# Patient Record
Sex: MALE | Race: WHITE | NOT HISPANIC OR LATINO
[De-identification: names, ages, dates, MRNs, and addresses within clinical notes are randomized per-mention and may not be internally consistent; named-entity substitution may affect disease eponyms.]

---

## 2017-07-20 ENCOUNTER — APPOINTMENT (OUTPATIENT)
Dept: FAMILY MEDICINE | Facility: CLINIC | Age: 82
End: 2017-07-20
Payer: COMMERCIAL

## 2017-07-20 VITALS
BODY MASS INDEX: 23.86 KG/M2 | WEIGHT: 152 LBS | HEART RATE: 62 BPM | HEIGHT: 67 IN | DIASTOLIC BLOOD PRESSURE: 50 MMHG | OXYGEN SATURATION: 95 % | SYSTOLIC BLOOD PRESSURE: 102 MMHG

## 2017-07-20 DIAGNOSIS — Z63.4 DISAPPEARANCE AND DEATH OF FAMILY MEMBER: ICD-10-CM

## 2017-07-20 DIAGNOSIS — Z80.9 FAMILY HISTORY OF MALIGNANT NEOPLASM, UNSPECIFIED: ICD-10-CM

## 2017-07-20 DIAGNOSIS — H91.90 UNSPECIFIED HEARING LOSS, UNSPECIFIED EAR: ICD-10-CM

## 2017-07-20 DIAGNOSIS — Z87.891 PERSONAL HISTORY OF NICOTINE DEPENDENCE: ICD-10-CM

## 2017-07-20 DIAGNOSIS — Z78.9 OTHER SPECIFIED HEALTH STATUS: ICD-10-CM

## 2017-07-20 DIAGNOSIS — N40.0 BENIGN PROSTATIC HYPERPLASIA WITHOUT LOWER URINARY TRACT SYMPMS: ICD-10-CM

## 2017-07-20 DIAGNOSIS — H11.002 UNSPECIFIED PTERYGIUM OF LEFT EYE: ICD-10-CM

## 2017-07-20 LAB
INR PPP: 1.8 RATIO
POCT-PROTHROMBIN TIME: 21.5 SECS
QUALITY CONTROL: YES

## 2017-07-20 PROCEDURE — 99204 OFFICE O/P NEW MOD 45 MIN: CPT | Mod: 25

## 2017-07-20 PROCEDURE — 85610 PROTHROMBIN TIME: CPT | Mod: QW

## 2017-07-20 PROCEDURE — 36415 COLL VENOUS BLD VENIPUNCTURE: CPT

## 2017-07-20 SDOH — SOCIAL STABILITY - SOCIAL INSECURITY: DISSAPEARANCE AND DEATH OF FAMILY MEMBER: Z63.4

## 2017-08-03 ENCOUNTER — APPOINTMENT (OUTPATIENT)
Dept: FAMILY MEDICINE | Facility: CLINIC | Age: 82
End: 2017-08-03
Payer: COMMERCIAL

## 2017-08-03 VITALS — DIASTOLIC BLOOD PRESSURE: 70 MMHG | SYSTOLIC BLOOD PRESSURE: 110 MMHG | HEART RATE: 60 BPM

## 2017-08-03 VITALS — HEIGHT: 67 IN | BODY MASS INDEX: 24.01 KG/M2 | WEIGHT: 153 LBS

## 2017-08-03 DIAGNOSIS — D64.9 ANEMIA, UNSPECIFIED: ICD-10-CM

## 2017-08-03 LAB
ALBUMIN SERPL ELPH-MCNC: 4.3 G/DL
ALP BLD-CCNC: 52 U/L
ALT SERPL-CCNC: 24 U/L
ANION GAP SERPL CALC-SCNC: 15 MMOL/L
AST SERPL-CCNC: 19 U/L
BASOPHILS # BLD AUTO: 0.02 K/UL
BASOPHILS NFR BLD AUTO: 0.3 %
BILIRUB SERPL-MCNC: 0.8 MG/DL
BUN SERPL-MCNC: 39 MG/DL
CALCIUM SERPL-MCNC: 10.1 MG/DL
CHLORIDE SERPL-SCNC: 102 MMOL/L
CHOLEST SERPL-MCNC: 147 MG/DL
CHOLEST/HDLC SERPL: 3.4 RATIO
CO2 SERPL-SCNC: 24 MMOL/L
CREAT SERPL-MCNC: 1.64 MG/DL
EOSINOPHIL # BLD AUTO: 0.19 K/UL
EOSINOPHIL NFR BLD AUTO: 2.8 %
GLUCOSE SERPL-MCNC: 126 MG/DL
HBA1C MFR BLD HPLC: 7.3 %
HCT VFR BLD CALC: 37.5 %
HDLC SERPL-MCNC: 43 MG/DL
HGB BLD-MCNC: 11.7 G/DL
IMM GRANULOCYTES NFR BLD AUTO: 0.1 %
INR PPP: 2.2 RATIO
LDLC SERPL CALC-MCNC: 77 MG/DL
LYMPHOCYTES # BLD AUTO: 1.72 K/UL
LYMPHOCYTES NFR BLD AUTO: 25.3 %
MAN DIFF?: NORMAL
MCHC RBC-ENTMCNC: 30.2 PG
MCHC RBC-ENTMCNC: 31.2 GM/DL
MCV RBC AUTO: 96.6 FL
MONOCYTES # BLD AUTO: 0.54 K/UL
MONOCYTES NFR BLD AUTO: 7.9 %
NEUTROPHILS # BLD AUTO: 4.32 K/UL
NEUTROPHILS NFR BLD AUTO: 63.6 %
PLATELET # BLD AUTO: 146 K/UL
POCT-PROTHROMBIN TIME: 25.9 SECS
POTASSIUM SERPL-SCNC: 5.4 MMOL/L
PROT SERPL-MCNC: 7.4 G/DL
QUALITY CONTROL: YES
RBC # BLD: 3.88 M/UL
RBC # FLD: 14 %
SODIUM SERPL-SCNC: 141 MMOL/L
T4 FREE SERPL-MCNC: 1.4 NG/DL
TRIGL SERPL-MCNC: 136 MG/DL
TSH SERPL-ACNC: 2.76 UIU/ML
WBC # FLD AUTO: 6.8 K/UL

## 2017-08-03 PROCEDURE — 99214 OFFICE O/P EST MOD 30 MIN: CPT | Mod: 25

## 2017-08-03 PROCEDURE — 85610 PROTHROMBIN TIME: CPT | Mod: QW

## 2017-08-06 PROBLEM — D64.9 ANEMIA: Status: ACTIVE | Noted: 2017-08-03

## 2017-08-08 ENCOUNTER — LABORATORY RESULT (OUTPATIENT)
Age: 82
End: 2017-08-08

## 2017-08-28 ENCOUNTER — APPOINTMENT (OUTPATIENT)
Dept: FAMILY MEDICINE | Facility: CLINIC | Age: 82
End: 2017-08-28
Payer: COMMERCIAL

## 2017-08-28 VITALS
BODY MASS INDEX: 24.17 KG/M2 | HEIGHT: 67 IN | WEIGHT: 154 LBS | OXYGEN SATURATION: 98 % | HEART RATE: 62 BPM | SYSTOLIC BLOOD PRESSURE: 116 MMHG | DIASTOLIC BLOOD PRESSURE: 80 MMHG

## 2017-08-28 PROCEDURE — 99214 OFFICE O/P EST MOD 30 MIN: CPT

## 2017-09-06 ENCOUNTER — APPOINTMENT (OUTPATIENT)
Dept: FAMILY MEDICINE | Facility: CLINIC | Age: 82
End: 2017-09-06
Payer: COMMERCIAL

## 2017-09-06 LAB
INR PPP: 2.2 RATIO
POCT-PROTHROMBIN TIME: 26.6 SECS

## 2017-09-06 PROCEDURE — 90662 IIV NO PRSV INCREASED AG IM: CPT

## 2017-09-06 PROCEDURE — G0008: CPT

## 2017-09-06 PROCEDURE — 85610 PROTHROMBIN TIME: CPT | Mod: QW

## 2017-09-06 PROCEDURE — 99212 OFFICE O/P EST SF 10 MIN: CPT | Mod: 25

## 2017-09-07 ENCOUNTER — MEDICATION RENEWAL (OUTPATIENT)
Age: 82
End: 2017-09-07

## 2017-09-13 ENCOUNTER — OTHER (OUTPATIENT)
Age: 82
End: 2017-09-13

## 2017-10-11 ENCOUNTER — APPOINTMENT (OUTPATIENT)
Dept: FAMILY MEDICINE | Facility: CLINIC | Age: 82
End: 2017-10-11
Payer: COMMERCIAL

## 2017-10-11 VITALS
OXYGEN SATURATION: 98 % | DIASTOLIC BLOOD PRESSURE: 65 MMHG | HEART RATE: 66 BPM | HEIGHT: 67 IN | SYSTOLIC BLOOD PRESSURE: 106 MMHG | BODY MASS INDEX: 24.17 KG/M2 | RESPIRATION RATE: 16 BRPM | WEIGHT: 154 LBS

## 2017-10-11 VITALS — RESPIRATION RATE: 12 BRPM

## 2017-10-11 LAB
INR PPP: 1.9 RATIO
POCT-PROTHROMBIN TIME: 23.1 SECS
QUALITY CONTROL: YES

## 2017-10-11 PROCEDURE — 36415 COLL VENOUS BLD VENIPUNCTURE: CPT

## 2017-10-11 PROCEDURE — 85610 PROTHROMBIN TIME: CPT | Mod: QW

## 2017-10-11 PROCEDURE — 99214 OFFICE O/P EST MOD 30 MIN: CPT | Mod: 25

## 2017-10-25 ENCOUNTER — MEDICATION RENEWAL (OUTPATIENT)
Age: 82
End: 2017-10-25

## 2017-11-02 LAB
ALBUMIN SERPL ELPH-MCNC: 4.1 G/DL
ALP BLD-CCNC: 58 U/L
ALT SERPL-CCNC: 21 U/L
ANION GAP SERPL CALC-SCNC: 13 MMOL/L
AST SERPL-CCNC: 20 U/L
BASOPHILS # BLD AUTO: 0.03 K/UL
BASOPHILS NFR BLD AUTO: 0.5 %
BILIRUB SERPL-MCNC: 0.7 MG/DL
BUN SERPL-MCNC: 43 MG/DL
CALCIUM SERPL-MCNC: 10.7 MG/DL
CHLORIDE SERPL-SCNC: 102 MMOL/L
CO2 SERPL-SCNC: 25 MMOL/L
CREAT SERPL-MCNC: 1.8 MG/DL
EOSINOPHIL # BLD AUTO: 0.23 K/UL
EOSINOPHIL NFR BLD AUTO: 3.6 %
GLUCOSE SERPL-MCNC: 144 MG/DL
HBA1C MFR BLD HPLC: 7.1 %
HCT VFR BLD CALC: 36.2 %
HGB BLD-MCNC: 11.7 G/DL
IMM GRANULOCYTES NFR BLD AUTO: 0.3 %
LYMPHOCYTES # BLD AUTO: 1.74 K/UL
LYMPHOCYTES NFR BLD AUTO: 26.9 %
MAN DIFF?: NORMAL
MCHC RBC-ENTMCNC: 30.9 PG
MCHC RBC-ENTMCNC: 32.3 GM/DL
MCV RBC AUTO: 95.5 FL
MONOCYTES # BLD AUTO: 0.61 K/UL
MONOCYTES NFR BLD AUTO: 9.4 %
NEUTROPHILS # BLD AUTO: 3.84 K/UL
NEUTROPHILS NFR BLD AUTO: 59.3 %
PLATELET # BLD AUTO: 166 K/UL
POTASSIUM SERPL-SCNC: 5 MMOL/L
PROT SERPL-MCNC: 7.4 G/DL
RBC # BLD: 3.79 M/UL
RBC # FLD: 13.6 %
SODIUM SERPL-SCNC: 140 MMOL/L
WBC # FLD AUTO: 6.47 K/UL

## 2017-11-08 ENCOUNTER — APPOINTMENT (OUTPATIENT)
Dept: FAMILY MEDICINE | Facility: CLINIC | Age: 82
End: 2017-11-08

## 2017-11-27 ENCOUNTER — NON-APPOINTMENT (OUTPATIENT)
Age: 82
End: 2017-11-27

## 2017-11-27 ENCOUNTER — APPOINTMENT (OUTPATIENT)
Dept: FAMILY MEDICINE | Facility: CLINIC | Age: 82
End: 2017-11-27
Payer: COMMERCIAL

## 2017-11-27 VITALS
HEIGHT: 67 IN | HEART RATE: 76 BPM | DIASTOLIC BLOOD PRESSURE: 70 MMHG | WEIGHT: 155 LBS | SYSTOLIC BLOOD PRESSURE: 132 MMHG | BODY MASS INDEX: 24.33 KG/M2

## 2017-11-27 LAB
INR PPP: 2.3 RATIO
POCT-PROTHROMBIN TIME: 27.3 SECS

## 2017-11-27 PROCEDURE — 99214 OFFICE O/P EST MOD 30 MIN: CPT | Mod: 25

## 2017-11-27 PROCEDURE — 85610 PROTHROMBIN TIME: CPT | Mod: QW

## 2017-11-27 PROCEDURE — 93000 ELECTROCARDIOGRAM COMPLETE: CPT

## 2017-12-29 ENCOUNTER — RECORD ABSTRACTING (OUTPATIENT)
Age: 82
End: 2017-12-29

## 2017-12-29 DIAGNOSIS — Z82.49 FAMILY HISTORY OF ISCHEMIC HEART DISEASE AND OTHER DISEASES OF THE CIRCULATORY SYSTEM: ICD-10-CM

## 2017-12-29 DIAGNOSIS — Z86.79 PERSONAL HISTORY OF OTHER DISEASES OF THE CIRCULATORY SYSTEM: ICD-10-CM

## 2017-12-29 DIAGNOSIS — Z95.0 PRESENCE OF CARDIAC PACEMAKER: ICD-10-CM

## 2018-01-03 ENCOUNTER — APPOINTMENT (OUTPATIENT)
Dept: FAMILY MEDICINE | Facility: CLINIC | Age: 83
End: 2018-01-03
Payer: COMMERCIAL

## 2018-01-03 VITALS
HEART RATE: 80 BPM | WEIGHT: 155 LBS | BODY MASS INDEX: 24.33 KG/M2 | DIASTOLIC BLOOD PRESSURE: 68 MMHG | SYSTOLIC BLOOD PRESSURE: 112 MMHG | HEIGHT: 67 IN

## 2018-01-03 LAB
INR PPP: 2 RATIO
POCT-PROTHROMBIN TIME: 24.3 SECS

## 2018-01-03 PROCEDURE — 36415 COLL VENOUS BLD VENIPUNCTURE: CPT

## 2018-01-03 PROCEDURE — 99214 OFFICE O/P EST MOD 30 MIN: CPT | Mod: 25

## 2018-01-03 PROCEDURE — 85610 PROTHROMBIN TIME: CPT | Mod: QW

## 2018-01-03 RX ORDER — ROSUVASTATIN CALCIUM 5 MG/1
TABLET, FILM COATED ORAL
Refills: 0 | Status: DISCONTINUED | COMMUNITY
End: 2018-01-03

## 2018-01-03 RX ORDER — DIGOXIN 125 UG/1
125 TABLET ORAL
Refills: 0 | Status: DISCONTINUED | COMMUNITY
End: 2018-01-03

## 2018-01-16 LAB
ANION GAP SERPL CALC-SCNC: 14 MMOL/L
BUN SERPL-MCNC: 44 MG/DL
CALCIUM SERPL-MCNC: 10 MG/DL
CHLORIDE SERPL-SCNC: 105 MMOL/L
CO2 SERPL-SCNC: 23 MMOL/L
CREAT SERPL-MCNC: 1.59 MG/DL
GLUCOSE SERPL-MCNC: 126 MG/DL
HBA1C MFR BLD HPLC: 7.2 %
POTASSIUM SERPL-SCNC: 4.9 MMOL/L
SODIUM SERPL-SCNC: 142 MMOL/L

## 2018-01-22 ENCOUNTER — RECORD ABSTRACTING (OUTPATIENT)
Age: 83
End: 2018-01-22

## 2018-01-29 ENCOUNTER — APPOINTMENT (OUTPATIENT)
Dept: CARDIOLOGY | Facility: CLINIC | Age: 83
End: 2018-01-29
Payer: COMMERCIAL

## 2018-01-29 VITALS
WEIGHT: 152 LBS | HEIGHT: 67 IN | DIASTOLIC BLOOD PRESSURE: 55 MMHG | SYSTOLIC BLOOD PRESSURE: 110 MMHG | HEART RATE: 64 BPM | BODY MASS INDEX: 23.86 KG/M2 | RESPIRATION RATE: 16 BRPM

## 2018-01-29 DIAGNOSIS — R07.89 OTHER CHEST PAIN: ICD-10-CM

## 2018-01-29 PROCEDURE — 93000 ELECTROCARDIOGRAM COMPLETE: CPT

## 2018-01-29 PROCEDURE — 99214 OFFICE O/P EST MOD 30 MIN: CPT

## 2018-02-07 ENCOUNTER — APPOINTMENT (OUTPATIENT)
Dept: FAMILY MEDICINE | Facility: CLINIC | Age: 83
End: 2018-02-07
Payer: MEDICARE

## 2018-02-07 VITALS — HEART RATE: 66 BPM | SYSTOLIC BLOOD PRESSURE: 120 MMHG | DIASTOLIC BLOOD PRESSURE: 62 MMHG | HEIGHT: 67 IN

## 2018-02-07 DIAGNOSIS — M48.061 SPINAL STENOSIS, LUMBAR REGION WITHOUT NEUROGENIC CLAUDICATION: ICD-10-CM

## 2018-02-07 LAB
INR PPP: 1.9 RATIO
POCT-PROTHROMBIN TIME: 22.3 SECS

## 2018-02-07 PROCEDURE — 99214 OFFICE O/P EST MOD 30 MIN: CPT | Mod: 25

## 2018-02-07 PROCEDURE — 85610 PROTHROMBIN TIME: CPT | Mod: QW

## 2018-02-23 PROBLEM — M48.061 LUMBAR STENOSIS: Status: ACTIVE | Noted: 2017-07-20

## 2018-03-06 ENCOUNTER — RX RENEWAL (OUTPATIENT)
Age: 83
End: 2018-03-06

## 2018-03-07 ENCOUNTER — APPOINTMENT (OUTPATIENT)
Dept: FAMILY MEDICINE | Facility: CLINIC | Age: 83
End: 2018-03-07
Payer: COMMERCIAL

## 2018-03-07 VITALS
HEART RATE: 65 BPM | WEIGHT: 154 LBS | SYSTOLIC BLOOD PRESSURE: 108 MMHG | HEIGHT: 67 IN | OXYGEN SATURATION: 99 % | DIASTOLIC BLOOD PRESSURE: 62 MMHG | BODY MASS INDEX: 24.17 KG/M2

## 2018-03-07 DIAGNOSIS — Z13.89 ENCOUNTER FOR SCREENING FOR OTHER DISORDER: ICD-10-CM

## 2018-03-07 DIAGNOSIS — Z92.29 PERSONAL HISTORY OF OTHER DRUG THERAPY: ICD-10-CM

## 2018-03-07 LAB
INR PPP: 2.1 RATIO
POCT-PROTHROMBIN TIME: 25.8 SECS

## 2018-03-07 PROCEDURE — G0444 DEPRESSION SCREEN ANNUAL: CPT

## 2018-03-07 PROCEDURE — 85610 PROTHROMBIN TIME: CPT | Mod: QW

## 2018-03-07 PROCEDURE — 99214 OFFICE O/P EST MOD 30 MIN: CPT | Mod: 25

## 2018-03-07 RX ORDER — AMOXICILLIN 500 MG/1
500 CAPSULE ORAL
Qty: 20 | Refills: 0 | Status: COMPLETED | COMMUNITY
Start: 2017-11-02 | End: 2018-03-07

## 2018-03-08 PROBLEM — Z13.89 DEPRESSION SCREENING: Status: ACTIVE | Noted: 2018-03-08

## 2018-03-12 ENCOUNTER — RX RENEWAL (OUTPATIENT)
Age: 83
End: 2018-03-12

## 2018-03-29 ENCOUNTER — APPOINTMENT (OUTPATIENT)
Dept: CARDIOLOGY | Facility: CLINIC | Age: 83
End: 2018-03-29
Payer: COMMERCIAL

## 2018-03-29 PROCEDURE — 93294 REM INTERROG EVL PM/LDLS PM: CPT

## 2018-04-03 ENCOUNTER — APPOINTMENT (OUTPATIENT)
Dept: CARDIOLOGY | Facility: CLINIC | Age: 83
End: 2018-04-03
Payer: COMMERCIAL

## 2018-04-03 VITALS
DIASTOLIC BLOOD PRESSURE: 60 MMHG | HEART RATE: 66 BPM | RESPIRATION RATE: 16 BRPM | WEIGHT: 154 LBS | BODY MASS INDEX: 24.17 KG/M2 | SYSTOLIC BLOOD PRESSURE: 120 MMHG | HEIGHT: 67 IN

## 2018-04-03 DIAGNOSIS — I63.9 CEREBRAL INFARCTION, UNSPECIFIED: ICD-10-CM

## 2018-04-03 DIAGNOSIS — E11.9 TYPE 2 DIABETES MELLITUS W/OUT COMPLICATIONS: ICD-10-CM

## 2018-04-03 PROCEDURE — 93000 ELECTROCARDIOGRAM COMPLETE: CPT

## 2018-04-03 PROCEDURE — 99214 OFFICE O/P EST MOD 30 MIN: CPT

## 2018-04-11 ENCOUNTER — APPOINTMENT (OUTPATIENT)
Dept: FAMILY MEDICINE | Facility: CLINIC | Age: 83
End: 2018-04-11
Payer: COMMERCIAL

## 2018-04-11 VITALS
DIASTOLIC BLOOD PRESSURE: 60 MMHG | SYSTOLIC BLOOD PRESSURE: 110 MMHG | HEART RATE: 68 BPM | BODY MASS INDEX: 25.27 KG/M2 | WEIGHT: 161 LBS | HEIGHT: 67 IN

## 2018-04-11 DIAGNOSIS — I51.9 HEART DISEASE, UNSPECIFIED: ICD-10-CM

## 2018-04-11 LAB
INR PPP: 2.4 RATIO
POCT-PROTHROMBIN TIME: 28.6 SECS

## 2018-04-11 PROCEDURE — 99214 OFFICE O/P EST MOD 30 MIN: CPT | Mod: 25

## 2018-04-11 PROCEDURE — 36415 COLL VENOUS BLD VENIPUNCTURE: CPT

## 2018-04-11 PROCEDURE — 85610 PROTHROMBIN TIME: CPT | Mod: QW

## 2018-04-12 LAB
ALBUMIN SERPL ELPH-MCNC: 4.1 G/DL
ALP BLD-CCNC: 61 U/L
ALT SERPL-CCNC: 19 U/L
ANION GAP SERPL CALC-SCNC: 15 MMOL/L
AST SERPL-CCNC: 21 U/L
BILIRUB SERPL-MCNC: 0.6 MG/DL
BUN SERPL-MCNC: 35 MG/DL
CALCIUM SERPL-MCNC: 10.3 MG/DL
CHLORIDE SERPL-SCNC: 104 MMOL/L
CHOLEST SERPL-MCNC: 139 MG/DL
CHOLEST/HDLC SERPL: 3.4 RATIO
CO2 SERPL-SCNC: 24 MMOL/L
CREAT SERPL-MCNC: 1.71 MG/DL
GLUCOSE SERPL-MCNC: 146 MG/DL
HBA1C MFR BLD HPLC: 6.8 %
HDLC SERPL-MCNC: 41 MG/DL
LDLC SERPL CALC-MCNC: 75 MG/DL
POTASSIUM SERPL-SCNC: 4.6 MMOL/L
PROT SERPL-MCNC: 7.3 G/DL
SODIUM SERPL-SCNC: 143 MMOL/L
TRIGL SERPL-MCNC: 114 MG/DL

## 2018-05-16 ENCOUNTER — APPOINTMENT (OUTPATIENT)
Dept: FAMILY MEDICINE | Facility: CLINIC | Age: 83
End: 2018-05-16
Payer: COMMERCIAL

## 2018-05-16 VITALS
HEIGHT: 67 IN | DIASTOLIC BLOOD PRESSURE: 62 MMHG | HEART RATE: 64 BPM | SYSTOLIC BLOOD PRESSURE: 108 MMHG | BODY MASS INDEX: 25.01 KG/M2 | WEIGHT: 159.38 LBS

## 2018-05-16 DIAGNOSIS — R79.89 OTHER SPECIFIED ABNORMAL FINDINGS OF BLOOD CHEMISTRY: ICD-10-CM

## 2018-05-16 LAB
INR PPP: 2.3 RATIO
POCT-PROTHROMBIN TIME: 27.4 SECS
QUALITY CONTROL: YES

## 2018-05-16 PROCEDURE — 99214 OFFICE O/P EST MOD 30 MIN: CPT | Mod: 25

## 2018-05-16 PROCEDURE — 85610 PROTHROMBIN TIME: CPT | Mod: QW

## 2018-05-16 NOTE — COUNSELING
[Healthy eating counseling provided] : healthy eating [Disease Management counseling provided] : disease management  [Low Fat Diet] : Low fat diet [Take medications as directed] : Take medications as directed [None] : None [Good understanding] : Patient has a good understanding of lifestyle changes and the steps needed to achieve self management goals

## 2018-05-16 NOTE — PLAN
[FreeTextEntry1] : UPCOMING APPOINTMENT WITH OPHTHALMOLOGY - DR. REAGAN\par DISCUSSED RENAL SONOGRAM - IS UNSURE IF HE WILL GO\par DUE TO ADVANCED AGE IS NOT INTERESTED IN HAVING MUCH TESTING DONE\par DISCUSSED RENAL EVALUATION AS WELL\par POCT - INR DONE\par SEE WARFARIN FLOW SHEET\par CONSISTENCY WITH DIET AND FALL PRECAUTIONS\par HBA1C WELL CONTROLLED\par HEALTHY DIET AND LIFESTYLE MODIFICATIONS\par CALL WITH ANY QUESTIONS, CONCERNS OR CHANGES \par TO HAVE DAUGHTER CALL WITH ANY QUESTIONS/CONCERNS\par AWARE WHEN TO SEEK EMERGENT CARE

## 2018-05-16 NOTE — REVIEW OF SYSTEMS
[Back Pain] : back pain [Fever] : no fever [Fatigue] : no fatigue [Pain] : no pain [Redness] : no redness [Chest Pain] : no chest pain [Palpitations] : no palpitations [Shortness Of Breath] : no shortness of breath [Wheezing] : no wheezing [Cough] : no cough [Abdominal Pain] : no abdominal pain [Nausea] : no nausea [Diarrhea] : no diarrhea [Vomiting] : no vomiting [Melena] : no melena [Dysuria] : no dysuria [Hematuria] : no hematuria [Joint Pain] : no joint pain [Headache] : no headache [Dizziness] : no dizziness [Fainting] : no fainting

## 2018-05-16 NOTE — HEALTH RISK ASSESSMENT
[No falls in past year] : Patient reported no falls in the past year [0] : 2) Feeling down, depressed, or hopeless: Not at all (0) [] : No [[id number]] : 0

## 2018-05-16 NOTE — PHYSICAL EXAM
[No Acute Distress] : no acute distress [Well Nourished] : well nourished [Well-Appearing] : well-appearing [PERRL] : pupils equal round and reactive to light [EOMI] : extraocular movements intact [Supple] : supple [No Lymphadenopathy] : no lymphadenopathy [No Respiratory Distress] : no respiratory distress  [Clear to Auscultation] : lungs were clear to auscultation bilaterally [No Accessory Muscle Use] : no accessory muscle use [Normal Rate] : normal rate  [Normal S1, S2] : normal S1 and S2 [Soft] : abdomen soft [Non Tender] : non-tender [Normal Bowel Sounds] : normal bowel sounds [Speech Grossly Normal] : speech grossly normal [Normal Mood] : the mood was normal

## 2018-05-16 NOTE — HISTORY OF PRESENT ILLNESS
[Patient was last seen on ___] : Patient was last seen on [unfilled] [No episodes] : No hypoglycemic episodes since the last visit. [Check glucose ___ x/day] : Patient checks  blood glucose [unfilled]  times a day [Fasting:  ___] : Fasting Blood Sugar: [unfilled] mg/dL [] : sometimes in range [Understanding of foot care] : Patient expressed understanding of foot care [Most Recent A1C: ___] : Most recent A1C was [unfilled] [Target goal met] : A1C target goal met [Moderate Intensity] : Patient is currently on moderate intensity statin  therapy [Doing Well] : Patient is doing well [Managed with medications] : managed with  medication [Hyperlipidemia] : Hyperlipidemia [Other: ___] : [unfilled] [FreeTextEntry6] : PRESENTING FOR FOLLOW-UP WITH AIDE.  HAS PILL BOX SET-UP.  CHRONIC HISTORY OF AFIB.  MAY HAVE MISSED ONE DOSE OF WARFARIN. NO FALLS OR CHANGE IN DIET.  NO CHANGES IN MEDICATIONS.  NO SIGNS OR SYMPTOMS OF BLEEDING PRESENT.  APPETITE IS "GOOD".  HAS UPCOMING APPOINTMENT WITH OPHTHALMOLOGIST.  MOST RECENT HBA1C WELL CONTROLLED.  ON CRESTOR FOR CHOLESTEROL.   HAS HAD NO HEADACHE, DIZZINESS, CHEST PAIN, SHORTNESS OF BREATH, GI OR URINARY COMPLAINTS.  PRIOR ELEVATED CREATININE.  HAS DECLINED FURTHER EVALUATION IN THE PAST.  NO OTHER COMPLAINTS TODAY.  [EyeExamDate] : SUMMER 2017

## 2018-06-03 ENCOUNTER — FORM ENCOUNTER (OUTPATIENT)
Age: 83
End: 2018-06-03

## 2018-06-04 ENCOUNTER — APPOINTMENT (OUTPATIENT)
Dept: FAMILY MEDICINE | Facility: CLINIC | Age: 83
End: 2018-06-04
Payer: COMMERCIAL

## 2018-06-04 ENCOUNTER — APPOINTMENT (OUTPATIENT)
Dept: RADIOLOGY | Facility: CLINIC | Age: 83
End: 2018-06-04

## 2018-06-04 ENCOUNTER — FORM ENCOUNTER (OUTPATIENT)
Age: 83
End: 2018-06-04

## 2018-06-04 ENCOUNTER — NON-APPOINTMENT (OUTPATIENT)
Age: 83
End: 2018-06-04

## 2018-06-04 ENCOUNTER — OUTPATIENT (OUTPATIENT)
Dept: OUTPATIENT SERVICES | Facility: HOSPITAL | Age: 83
LOS: 1 days | End: 2018-06-04
Payer: COMMERCIAL

## 2018-06-04 VITALS
OXYGEN SATURATION: 98 % | WEIGHT: 159 LBS | DIASTOLIC BLOOD PRESSURE: 68 MMHG | BODY MASS INDEX: 24.96 KG/M2 | HEART RATE: 64 BPM | HEIGHT: 67 IN | TEMPERATURE: 98.9 F | SYSTOLIC BLOOD PRESSURE: 120 MMHG

## 2018-06-04 DIAGNOSIS — R05 COUGH: ICD-10-CM

## 2018-06-04 DIAGNOSIS — R09.81 NASAL CONGESTION: ICD-10-CM

## 2018-06-04 DIAGNOSIS — R06.02 SHORTNESS OF BREATH: ICD-10-CM

## 2018-06-04 PROCEDURE — 99214 OFFICE O/P EST MOD 30 MIN: CPT | Mod: 25

## 2018-06-04 PROCEDURE — 71046 X-RAY EXAM CHEST 2 VIEWS: CPT

## 2018-06-04 PROCEDURE — 71046 X-RAY EXAM CHEST 2 VIEWS: CPT | Mod: 26

## 2018-06-04 PROCEDURE — 93000 ELECTROCARDIOGRAM COMPLETE: CPT

## 2018-06-05 ENCOUNTER — APPOINTMENT (OUTPATIENT)
Dept: CT IMAGING | Facility: CLINIC | Age: 83
End: 2018-06-05

## 2018-06-05 ENCOUNTER — OUTPATIENT (OUTPATIENT)
Dept: OUTPATIENT SERVICES | Facility: HOSPITAL | Age: 83
LOS: 1 days | End: 2018-06-05
Payer: COMMERCIAL

## 2018-06-05 DIAGNOSIS — R05 COUGH: ICD-10-CM

## 2018-06-05 DIAGNOSIS — R06.02 SHORTNESS OF BREATH: ICD-10-CM

## 2018-06-05 PROCEDURE — 71250 CT THORAX DX C-: CPT

## 2018-06-05 PROCEDURE — 71250 CT THORAX DX C-: CPT | Mod: 26

## 2018-06-06 ENCOUNTER — APPOINTMENT (OUTPATIENT)
Dept: CARDIOLOGY | Facility: CLINIC | Age: 83
End: 2018-06-06
Payer: COMMERCIAL

## 2018-06-06 VITALS
SYSTOLIC BLOOD PRESSURE: 126 MMHG | BODY MASS INDEX: 24.8 KG/M2 | OXYGEN SATURATION: 98 % | RESPIRATION RATE: 12 BRPM | HEART RATE: 62 BPM | HEIGHT: 67 IN | DIASTOLIC BLOOD PRESSURE: 70 MMHG | WEIGHT: 158 LBS

## 2018-06-06 PROCEDURE — 93000 ELECTROCARDIOGRAM COMPLETE: CPT

## 2018-06-06 PROCEDURE — 99214 OFFICE O/P EST MOD 30 MIN: CPT

## 2018-06-20 ENCOUNTER — APPOINTMENT (OUTPATIENT)
Dept: FAMILY MEDICINE | Facility: CLINIC | Age: 83
End: 2018-06-20
Payer: COMMERCIAL

## 2018-06-20 VITALS
DIASTOLIC BLOOD PRESSURE: 50 MMHG | HEIGHT: 67 IN | HEART RATE: 65 BPM | BODY MASS INDEX: 24.48 KG/M2 | WEIGHT: 156 LBS | SYSTOLIC BLOOD PRESSURE: 120 MMHG

## 2018-06-20 LAB
INR PPP: 2.4 RATIO
POCT-PROTHROMBIN TIME: 28.4 SECS
QUALITY CONTROL: YES

## 2018-06-20 PROCEDURE — 99214 OFFICE O/P EST MOD 30 MIN: CPT | Mod: 25

## 2018-06-20 PROCEDURE — 85610 PROTHROMBIN TIME: CPT | Mod: QW

## 2018-06-23 PROBLEM — R09.81 NASAL CONGESTION: Status: ACTIVE | Noted: 2018-06-04

## 2018-06-23 PROBLEM — R05 COUGH: Status: ACTIVE | Noted: 2018-06-04

## 2018-06-23 NOTE — PHYSICAL EXAM
[No Acute Distress] : no acute distress [Well Nourished] : well nourished [Well-Appearing] : well-appearing [PERRL] : pupils equal round and reactive to light [EOMI] : extraocular movements intact [Supple] : supple [No Lymphadenopathy] : no lymphadenopathy [No Respiratory Distress] : no respiratory distress  [Clear to Auscultation] : lungs were clear to auscultation bilaterally [No Accessory Muscle Use] : no accessory muscle use [Normal Rate] : normal rate  [Normal S1, S2] : normal S1 and S2 [Soft] : abdomen soft [Non Tender] : non-tender [Normal Bowel Sounds] : normal bowel sounds [No Joint Swelling] : no joint swelling [Grossly Normal Strength/Tone] : grossly normal strength/tone

## 2018-06-23 NOTE — COUNSELING
[Healthy eating counseling provided] : healthy eating [Activity counseling provided] : activity [Low Salt Diet] : Low salt diet [None] : None [Good understanding] : Patient has a good understanding of lifestyle changes and the steps needed to achieve self management goals

## 2018-06-23 NOTE — REVIEW OF SYSTEMS
[Fever] : no fever [Fatigue] : no fatigue [Discharge] : no discharge [Pain] : no pain [Chest Pain] : no chest pain [Palpitations] : no palpitations [Lower Ext Edema] : no lower extremity edema [Shortness Of Breath] : no shortness of breath [Cough] : no cough [Abdominal Pain] : no abdominal pain [Nausea] : no nausea [Diarrhea] : no diarrhea [Vomiting] : no vomiting [Headache] : no headache [Dizziness] : no dizziness [Fainting] : no fainting [Easy Bleeding] : no easy bleeding [Easy Bruising] : no easy bruising

## 2018-06-23 NOTE — PLAN
[FreeTextEntry1] : RX NASAL SPRAY- REVIEWED USE\par EKG: PACED AT 60 BPM\par DAUGHTER CALLED AND DISCUSSED SYMPTOMS\par DOES NOT WANT ER OR HOSPITALIZATION NOW OR FUTURE\par WANTS TO MINIMIZE TESTING AND SPECIALIST EVALUATION\par WILL GO FOR CHEST XRAY TO START\par CONCERN FOR POSSIBLE CHF\par WILL TAKE EXTRA LASIX FOR THE NEXT TWO DAYS AND SEE CARDIOLOGY AFTER\par SODIUM AVOIDANCE, MONITOR FOR EDEMA AND INCREASED SHORTNESS OF BREATH\par CALL WITH ANY QUESTIONS, CONCERNS OR CHANGES \par AGAIN STRESS ER EVALUATION WITH WORSENING IF AGREEABLE

## 2018-06-23 NOTE — PLAN
[FreeTextEntry1] : DOES NOT WANT ANY OTHER PHYSICIANS OR TESTING AND WANTS TO AVOID ER AND HOSPITALIZATIONS.  DOES NOT WANT FURTHER IMAGING\par CT CHEST REVIEWED\par SODIUM AVOIDANCE\par MONITOR WEIGHT AND FOR EDEMA/SOB\par TO REPEAT BMP FOR CARDIOLOGY AND WILL BE FOLLOWING UP ROUTINELY\par POCT - INR DONE\par SEE WARFARIN FLOW SHEET\par FOLLOW-UP ALL SPECIALISTS AS DIRECTED \par CALL WITH ANY QUESTIONS, CONCERNS OR CHANGES

## 2018-06-23 NOTE — HISTORY OF PRESENT ILLNESS
[Congestion] : congestion [FreeTextEntry8] : PRESENTING FOR ACUTE VISIT WITH AIDE.  ON WEDNESDAY AND THURSDAY HAD NASAL CONGESTION.  HAD SOME COUGH AT NIGHT.  COUGH IMPROVED LAST NIGHT.  STILL CONGESTION.  NO FEVER.  THROAT IRRITATED.  NO KNOWN SICK CONTACTS.  NO MEDICATIONS TAKEN FOR SYMPTOMS.  DOES GET ALLERGIC RHINITIS.  HAS USED FLONASE IN THE PAST WHICH HAS HELPED. SYMPTOMS HAVE IMPROVED.  NO N/V/D.  NO EDEMA.  NO CHEST PAIN.  NO KNOWN HEMOPTYSIS PER AIDE REPORT.  HAS BEEN GETTING MORE SHORTNESS OF BREATH AS OF LATE.  DENIES OTHER COMPLAINTS TODAY.

## 2018-06-23 NOTE — PHYSICAL EXAM
[No Acute Distress] : no acute distress [Well Nourished] : well nourished [Well-Appearing] : well-appearing [PERRL] : pupils equal round and reactive to light [EOMI] : extraocular movements intact [Supple] : supple [No Lymphadenopathy] : no lymphadenopathy [Clear to Auscultation] : lungs were clear to auscultation bilaterally [No Accessory Muscle Use] : no accessory muscle use [Normal Rate] : normal rate  [Normal S1, S2] : normal S1 and S2 [Soft] : abdomen soft [Non Tender] : non-tender [Normal Bowel Sounds] : normal bowel sounds [Speech Grossly Normal] : speech grossly normal [Normal Affect] : the affect was normal [de-identified] : TACHYPNIC

## 2018-06-23 NOTE — REVIEW OF SYSTEMS
[Nasal Discharge] : nasal discharge [Shortness Of Breath] : shortness of breath [Cough] : cough [Fever] : no fever [Chills] : no chills [Fatigue] : no fatigue [Earache] : no earache [Sore Throat] : no sore throat [Chest Pain] : no chest pain [Palpitations] : no palpitations [Abdominal Pain] : no abdominal pain [Nausea] : no nausea [Vomiting] : no vomiting [Melena] : no melena [Headache] : no headache [Dizziness] : no dizziness [Fainting] : no fainting [Easy Bleeding] : no easy bleeding [Easy Bruising] : no easy bruising

## 2018-06-23 NOTE — HISTORY OF PRESENT ILLNESS
[Formal Caregiver] : formal caregiver [FreeTextEntry1] : F/U AFIB [de-identified] : PRESENTING FOR FOLLOW-UP.  SAW CARDIOLOGY AFTER LAST VISIT IN EVALUATION OF SOB AND CT FINDINGS.  SPIRONOLACTONE INCREASED TO EVERY OTHER DAY.  TO HAVE REPEAT BMP.  IS NOW FEELING MUCH BETTER.  SHORTNESS OF BREATH NOW "MUCH BETTER" PER AIDE REPORT.  NO EDEMA.  DENIES CHEST PAIN, PALPITATIONS, SYNCOPE, HEADACHE OR DIZZINESS.  HAS HAD NO FALLS.  USING ROLLING WALKER.  APPETITE GOOD.   AIDE SETS UP MEDICATIONS.  NO SIGNS OR SYMPTOMS OF BLEEDING ON COUMADIN FOR ANTICOAGULATION DUE TO AFIB.  NO NEW COMPLAINTS TODAY.

## 2018-06-28 ENCOUNTER — APPOINTMENT (OUTPATIENT)
Dept: CARDIOLOGY | Facility: CLINIC | Age: 83
End: 2018-06-28
Payer: COMMERCIAL

## 2018-06-28 PROCEDURE — 93294 REM INTERROG EVL PM/LDLS PM: CPT

## 2018-07-02 ENCOUNTER — RX RENEWAL (OUTPATIENT)
Age: 83
End: 2018-07-02

## 2018-07-05 ENCOUNTER — RESULT REVIEW (OUTPATIENT)
Age: 83
End: 2018-07-05

## 2018-07-05 LAB
ANION GAP SERPL CALC-SCNC: 13 MMOL/L
BUN SERPL-MCNC: 35 MG/DL
CALCIUM SERPL-MCNC: 10.2 MG/DL
CHLORIDE SERPL-SCNC: 103 MMOL/L
CO2 SERPL-SCNC: 24 MMOL/L
CREAT SERPL-MCNC: 1.83 MG/DL
GLUCOSE SERPL-MCNC: 172 MG/DL
POTASSIUM SERPL-SCNC: 4.3 MMOL/L
SODIUM SERPL-SCNC: 140 MMOL/L

## 2018-07-06 ENCOUNTER — APPOINTMENT (OUTPATIENT)
Dept: FAMILY MEDICINE | Facility: CLINIC | Age: 83
End: 2018-07-06
Payer: COMMERCIAL

## 2018-07-06 VITALS — TEMPERATURE: 97.9 F

## 2018-07-06 VITALS — SYSTOLIC BLOOD PRESSURE: 94 MMHG | DIASTOLIC BLOOD PRESSURE: 50 MMHG | HEART RATE: 64 BPM

## 2018-07-06 DIAGNOSIS — Z79.01 LONG TERM (CURRENT) USE OF ANTICOAGULANTS: ICD-10-CM

## 2018-07-06 DIAGNOSIS — I50.9 HEART FAILURE, UNSPECIFIED: ICD-10-CM

## 2018-07-06 LAB
INR PPP: 3 RATIO
POCT-PROTHROMBIN TIME: 35.6 SECS
QUALITY CONTROL: YES

## 2018-07-06 PROCEDURE — 99214 OFFICE O/P EST MOD 30 MIN: CPT | Mod: 25

## 2018-07-06 PROCEDURE — 85610 PROTHROMBIN TIME: CPT | Mod: QW

## 2018-07-06 NOTE — PHYSICAL EXAM
[Well Nourished] : well nourished [Supple] : supple [No Lymphadenopathy] : no lymphadenopathy [Clear to Auscultation] : lungs were clear to auscultation bilaterally [No Accessory Muscle Use] : no accessory muscle use [Regular Rhythm] : with a regular rhythm [Normal S1, S2] : normal S1 and S2 [Soft] : abdomen soft [Non Tender] : non-tender [Normal Bowel Sounds] : normal bowel sounds [No Joint Swelling] : no joint swelling [Grossly Normal Strength/Tone] : grossly normal strength/tone [No Rash] : no rash [Speech Grossly Normal] : speech grossly normal [Normal Mood] : the mood was normal [Normal Insight/Judgement] : insight and judgment were intact [Acne] : no acne [de-identified] : MILD TACHYPNEA [de-identified] : NO SIGNIFICANT EDEMA

## 2018-07-06 NOTE — REVIEW OF SYSTEMS
[Fatigue] : fatigue [Shortness Of Breath] : shortness of breath [Fever] : no fever [Earache] : no earache [Nasal Discharge] : no nasal discharge [Sore Throat] : no sore throat [Chest Pain] : no chest pain [Palpitations] : no palpitations [Lower Ext Edema] : no lower extremity edema [Wheezing] : no wheezing [Abdominal Pain] : no abdominal pain [Diarrhea] : no diarrhea [Vomiting] : no vomiting [Melena] : no melena [Dysuria] : no dysuria [Hematuria] : no hematuria [Headache] : no headache [Dizziness] : no dizziness [Fainting] : no fainting

## 2018-07-06 NOTE — PLAN
[FreeTextEntry1] : POCT - INR DONE; SEE WARFARIN FLOW SHEET\par LONG DISCUSSION TODAY IN OFFICE WITH MR. DENG AND FAMILY\par MR. DENG AND FAMILY REQUESTING I CALL HOSPICE PROVIDER FOR CERTIFICATION\par HE IS NOT INTERESTED IN GOING FOR ANY FURTHER LAB WORK, NO URINE TESTING, NO IMAGING, NO OTHER DOCTORS, NO ER, NO HOSPITAL.  THEY ARE LOOKING FOR MR. DENG TO BE HOME AND WITH COMFORT MEASURES ONLY\par HE HAS ELECTED FOR DNR/DNI STATUS\par I HAVE DISCUSSED THAT IT IS DIFFICULT FOR ME TO SAY THAT THE SOB IS ONLY FROM CHF WITHOUT FURTHER TESTING BUT ALL FURTHER TESTING DECLINED.  I HAVE ADVISED FOR HIM TO TAKE AN EXTRA LASIX TONIGHT TO SEE IF IMPROVEMENT AND HOSPICE PHYSICIAN WILL TAKE OVER CARE TOMORROW.  I SPOKE WITH HOSPICE INTAKE RN AND THEY ARE ALREADY ARRANGING OXYGEN.\par WILL CERTIFY FOR HOSPICE FOR CHF\par SUPPORT PROVIDED AT LENGTH\par ADVISED TO PLEASE CALL WITH ANY QUESTIONS OR CONCERNS

## 2018-07-06 NOTE — HISTORY OF PRESENT ILLNESS
[FreeTextEntry1] : SOB [de-identified] : PRESENTING FOR EVALUATION FOR HOSPICE WITH DAUGHTER, SON AND 24/7 AIDE.  HISTORY OF CHF, AFIB ON ANTICOAGULATION WITH WARFARIN, CKD, DIABETES, HEART DISEASE.  HAS HAD INCREASED SHORTNESS OF BREATH SINCE TUESDAY.  DECREASED PO INTAKE.  NO PAINS.  THEY CONTACTED HOSPICE TO COME IN TOMORROW FOR EVALUATION AND THEY WILL SEND OVER OXYGEN.  NOTES URINE DARKER.  SAW CARDIOLOGY RECENTLY AND MEDICATIONS WERE ADJUSTED.  HAS HAD NO EDEMA.  WAS TO FOLLOW UP WITH CARDIOLOGY IN ONE MONTH BUT CANCELLED APPOINTMENT.  IS NOT INTERESTED IN SEEING ANY PHYSICIANS.  DOES NOT WANT EMERGENCY ROOM OR HOSPITALIZATION.  DOES NOT WANT TO GO FOR ANY FURTHER TESTING.  WOULD LIKE TO BE HOME WITH COMFORT MEASURES ONLY.  FAMILY AWARE AND IN AGREEMENT.  NO FALLS.

## 2018-07-09 ENCOUNTER — APPOINTMENT (OUTPATIENT)
Dept: CARDIOLOGY | Facility: CLINIC | Age: 83
End: 2018-07-09

## 2018-07-09 ENCOUNTER — APPOINTMENT (OUTPATIENT)
Dept: CARDIOLOGY | Facility: CLINIC | Age: 83
End: 2018-07-09
Payer: COMMERCIAL

## 2018-07-09 VITALS
HEIGHT: 67 IN | OXYGEN SATURATION: 99 % | WEIGHT: 152.6 LBS | RESPIRATION RATE: 12 BRPM | DIASTOLIC BLOOD PRESSURE: 52 MMHG | BODY MASS INDEX: 23.95 KG/M2 | SYSTOLIC BLOOD PRESSURE: 112 MMHG | HEART RATE: 65 BPM

## 2018-07-09 DIAGNOSIS — Z00.00 ENCOUNTER FOR GENERAL ADULT MEDICAL EXAMINATION W/OUT ABNORMAL FINDINGS: ICD-10-CM

## 2018-07-09 DIAGNOSIS — G45.9 TRANSIENT CEREBRAL ISCHEMIC ATTACK, UNSPECIFIED: ICD-10-CM

## 2018-07-09 DIAGNOSIS — I49.5 SICK SINUS SYNDROME: ICD-10-CM

## 2018-07-09 DIAGNOSIS — I73.9 PERIPHERAL VASCULAR DISEASE, UNSPECIFIED: ICD-10-CM

## 2018-07-09 DIAGNOSIS — E78.00 PURE HYPERCHOLESTEROLEMIA, UNSPECIFIED: ICD-10-CM

## 2018-07-09 PROCEDURE — 99214 OFFICE O/P EST MOD 30 MIN: CPT

## 2018-07-09 PROCEDURE — 93000 ELECTROCARDIOGRAM COMPLETE: CPT

## 2018-07-09 RX ORDER — ROSUVASTATIN CALCIUM 5 MG/1
5 TABLET, FILM COATED ORAL
Qty: 90 | Refills: 1 | Status: ACTIVE | COMMUNITY
Start: 2018-03-06 | End: 1900-01-01

## 2018-07-09 RX ORDER — WARFARIN 2.5 MG/1
2.5 TABLET ORAL DAILY
Qty: 90 | Refills: 0 | Status: ACTIVE | COMMUNITY
Start: 1900-01-01 | End: 1900-01-01

## 2018-07-09 RX ORDER — DOXAZOSIN 2 MG/1
2 TABLET ORAL
Qty: 90 | Refills: 1 | Status: ACTIVE | COMMUNITY
Start: 2018-03-06 | End: 1900-01-01

## 2018-07-09 RX ORDER — FUROSEMIDE 40 MG/1
40 TABLET ORAL DAILY
Qty: 90 | Refills: 3 | Status: ACTIVE | COMMUNITY
Start: 2018-07-02 | End: 1900-01-01

## 2018-07-09 RX ORDER — METOPROLOL SUCCINATE 25 MG/1
25 TABLET, EXTENDED RELEASE ORAL DAILY
Qty: 90 | Refills: 3 | Status: ACTIVE | COMMUNITY
Start: 2018-03-12 | End: 1900-01-01

## 2018-07-09 RX ORDER — NITROGLYCERIN 40 MG/1
0.2 PATCH TRANSDERMAL DAILY
Qty: 90 | Refills: 3 | Status: ACTIVE | COMMUNITY
Start: 2018-01-29

## 2018-07-10 ENCOUNTER — APPOINTMENT (OUTPATIENT)
Dept: NEPHROLOGY | Facility: CLINIC | Age: 83
End: 2018-07-10
Payer: COMMERCIAL

## 2018-07-10 ENCOUNTER — APPOINTMENT (OUTPATIENT)
Dept: NEPHROLOGY | Facility: CLINIC | Age: 83
End: 2018-07-10

## 2018-07-10 VITALS
SYSTOLIC BLOOD PRESSURE: 120 MMHG | HEIGHT: 60 IN | BODY MASS INDEX: 30.04 KG/M2 | DIASTOLIC BLOOD PRESSURE: 42 MMHG | WEIGHT: 153 LBS | HEART RATE: 53 BPM

## 2018-07-10 DIAGNOSIS — N18.9 CHRONIC KIDNEY DISEASE, UNSPECIFIED: ICD-10-CM

## 2018-07-10 PROCEDURE — 99245 OFF/OP CONSLTJ NEW/EST HI 55: CPT

## 2018-07-12 LAB
APPEARANCE: ABNORMAL
BACTERIA: ABNORMAL
BILIRUBIN URINE: NEGATIVE
BLOOD URINE: ABNORMAL
COLOR: YELLOW
CREAT SPEC-SCNC: 39 MG/DL
CREAT/PROT UR: 1.3 RATIO
GLUCOSE QUALITATIVE U: NEGATIVE MG/DL
HYALINE CASTS: 4 /LPF
KETONES URINE: NEGATIVE
LEUKOCYTE ESTERASE URINE: ABNORMAL
MICROSCOPIC-UA: NORMAL
NITRITE URINE: POSITIVE
PH URINE: 6.5
PROT UR-MCNC: 50 MG/DL
PROTEIN URINE: 30 MG/DL
RED BLOOD CELLS URINE: 121 /HPF
SPECIFIC GRAVITY URINE: 1.01
SQUAMOUS EPITHELIAL CELLS: 0 /HPF
UROBILINOGEN URINE: NEGATIVE MG/DL
WHITE BLOOD CELLS URINE: >720 /HPF

## 2018-07-13 ENCOUNTER — APPOINTMENT (OUTPATIENT)
Dept: ULTRASOUND IMAGING | Facility: CLINIC | Age: 83
End: 2018-07-13

## 2018-07-15 LAB — CORE LAB FLUID CYTOLOGY: NORMAL

## 2018-07-17 PROBLEM — G45.9 TIA (TRANSIENT ISCHEMIC ATTACK): Status: ACTIVE | Noted: 2017-07-20

## 2018-07-17 PROBLEM — E78.00 HYPERCHOLESTEROLEMIA: Status: ACTIVE | Noted: 2017-12-29

## 2018-07-17 PROBLEM — I73.9 PAD (PERIPHERAL ARTERY DISEASE): Status: ACTIVE | Noted: 2017-12-29

## 2018-07-17 PROBLEM — I49.5 SICK SINUS SYNDROME: Status: ACTIVE | Noted: 2017-12-29

## 2018-07-18 ENCOUNTER — APPOINTMENT (OUTPATIENT)
Dept: FAMILY MEDICINE | Facility: CLINIC | Age: 83
End: 2018-07-18
Payer: COMMERCIAL

## 2018-07-18 VITALS — HEART RATE: 66 BPM | SYSTOLIC BLOOD PRESSURE: 115 MMHG | HEIGHT: 60 IN | DIASTOLIC BLOOD PRESSURE: 38 MMHG

## 2018-07-18 VITALS — SYSTOLIC BLOOD PRESSURE: 124 MMHG | DIASTOLIC BLOOD PRESSURE: 84 MMHG

## 2018-07-18 DIAGNOSIS — R06.02 SHORTNESS OF BREATH: ICD-10-CM

## 2018-07-18 DIAGNOSIS — E11.9 TYPE 2 DIABETES MELLITUS W/OUT COMPLICATIONS: ICD-10-CM

## 2018-07-18 DIAGNOSIS — I48.91 UNSPECIFIED ATRIAL FIBRILLATION: ICD-10-CM

## 2018-07-18 PROCEDURE — 99214 OFFICE O/P EST MOD 30 MIN: CPT

## 2018-07-26 ENCOUNTER — APPOINTMENT (OUTPATIENT)
Dept: NEPHROLOGY | Facility: CLINIC | Age: 83
End: 2018-07-26

## 2018-07-30 PROBLEM — E11.9 DIABETES MELLITUS: Status: ACTIVE | Noted: 2017-07-20

## 2018-07-30 PROBLEM — I48.91 AFIB: Status: ACTIVE | Noted: 2017-08-06

## 2018-07-30 PROBLEM — R06.02 SHORTNESS OF BREATH: Status: ACTIVE | Noted: 2018-06-04

## 2018-07-30 RX ORDER — SITAGLIPTIN 50 MG/1
50 TABLET, FILM COATED ORAL DAILY
Qty: 90 | Refills: 1 | Status: DISCONTINUED | COMMUNITY
Start: 2018-03-06 | End: 2018-07-30

## 2018-07-30 NOTE — PHYSICAL EXAM
[No Acute Distress] : no acute distress [Well Nourished] : well nourished [Well-Appearing] : well-appearing [Normal Sclera/Conjunctiva] : normal sclera/conjunctiva [PERRL] : pupils equal round and reactive to light [Supple] : supple [No Lymphadenopathy] : no lymphadenopathy [No Respiratory Distress] : no respiratory distress  [Clear to Auscultation] : lungs were clear to auscultation bilaterally [No Accessory Muscle Use] : no accessory muscle use [Normal Rate] : normal rate  [Normal S1, S2] : normal S1 and S2 [Soft] : abdomen soft [Non Tender] : non-tender [Normal Bowel Sounds] : normal bowel sounds [No Joint Swelling] : no joint swelling [Grossly Normal Strength/Tone] : grossly normal strength/tone

## 2018-07-30 NOTE — PLAN
[FreeTextEntry1] : LONG DISCUSSION WITH MR. DENG AND HIS DAUGHTER.  DUE TO POTENTIAL FALL RISK AND DARK URINE, THEY AGREE IT IS IN HIS BEST INTEREST TO HOLD WARFARIN.  AWARE OF THE INCREASED RISK OF STROKE, ETC. BUT RISK OF FALL IS GREATER AND THEY NO LONGER WANT HIM TO TAKE THE MEDICATION\par WILL ALSO HOLD JANUVIA \par PLANS TO SEE PULMONOLOGY FOR SHORTNESS OF BREATH\par TO FOLLOW-UP WITH CARDIOLOGY AS WELL\par AGAIN, NOT INTERESTED IN HAVING FURTHER TESTING.  I HAVE RECOMMENDED THAT WHEN SEEN BY PULMONOLOGY, IF PFT'S REQUESTED THEY SHOULD CONSIDER.  OTHERWISE, DOES NOT WANT FURTHER IMAGING INCLUDING OF CHEST IN FOLLOW-UP OF CT FINDINGS OR ANY CARDIAC WORK-UP INCLUDING CATH, ETC.\par DOES NOT WANT FUTURE ER OR HOSPITALIZATIONS\par WANTS COMFORT MEASURES\par SUPPORT PROVIDED\par CALL WITH ANY QUESTIONS, CONCERNS OR CHANGES

## 2018-07-30 NOTE — REVIEW OF SYSTEMS
[Shortness Of Breath] : shortness of breath [Fever] : no fever [Chills] : no chills [Chest Pain] : no chest pain [Palpitations] : no palpitations [Lower Ext Edema] : no lower extremity edema [Cough] : no cough [Abdominal Pain] : no abdominal pain [Diarrhea] : no diarrhea [Vomiting] : no vomiting [Dysuria] : no dysuria [Frequency] : no frequency [Headache] : no headache [Dizziness] : no dizziness [Fainting] : no fainting [Easy Bleeding] : no easy bleeding [Easy Bruising] : no easy bruising [FreeTextEntry8] : SEE HPI

## 2018-07-30 NOTE — HISTORY OF PRESENT ILLNESS
[FreeTextEntry1] : F/U AFIB [de-identified] : PRESENTING FOR FOLLOW-UP WITH DAUGHTER AND LIVE-IN AIDE.  STATES THAT HE WAS NOT ACCEPTED INTO HOSPICE.  IS NOT INTERESTED IN TESTING THEY ARE REQUESTING THROUGH CARDIOLOGY.  HAS HAD SHORTNESS OF BREATH WAXING AND WANING.  SAW CARDIOLOGY FOR EVALUATION AND ISCHEMIC WORK-UP ADVISED BUT DECLINED.  HAS HAD NO FALLS BUT THEY THINK THAT MR. DENG IS NOW A FALL RISK.  HAS BEEN ON ANTICOAGULATION FOR AFIB.  HAS ALSO HAD DARK URINE.  IS NOT INTERESTED IN TESTING OR WORK-UP.  DOES NOT WANT RENAL SONOGRAM.  GETS THE SOB WITH EXERTION MOSTLY.  BETTER WITH EXTRA LASIX.  HAS IMPROVED.  NO FEVER OR COUGH.  DENIES CHEST PAIN OR GI COMPLAINTS.  ON JANUVIA FOR DIABETES.  HAS NO OTHER COMPLAINTS TODAY.

## 2018-08-01 ENCOUNTER — APPOINTMENT (OUTPATIENT)
Dept: PULMONOLOGY | Facility: CLINIC | Age: 83
End: 2018-08-01

## 2018-08-01 RX ORDER — SPIRONOLACTONE 25 MG/1
25 TABLET ORAL
Qty: 90 | Refills: 1 | Status: ACTIVE | COMMUNITY

## 2018-08-01 RX ORDER — FLUTICASONE PROPIONATE 50 UG/1
50 SPRAY, METERED NASAL DAILY
Qty: 1 | Refills: 0 | Status: ACTIVE | COMMUNITY
Start: 2018-06-04

## 2018-08-07 ENCOUNTER — RX RENEWAL (OUTPATIENT)
Age: 83
End: 2018-08-07

## 2018-08-07 RX ORDER — BLOOD SUGAR DIAGNOSTIC
STRIP MISCELLANEOUS DAILY
Qty: 1 | Refills: 1 | Status: ACTIVE | COMMUNITY
Start: 2017-09-07 | End: 1900-01-01

## 2018-08-16 ENCOUNTER — APPOINTMENT (OUTPATIENT)
Dept: CARDIOLOGY | Facility: CLINIC | Age: 83
End: 2018-08-16

## 2018-08-16 ENCOUNTER — RX RENEWAL (OUTPATIENT)
Age: 83
End: 2018-08-16

## 2018-08-16 RX ORDER — LANCETS
EACH MISCELLANEOUS
Qty: 100 | Refills: 3 | Status: ACTIVE | COMMUNITY
Start: 2017-09-07 | End: 1900-01-01

## 2018-08-20 ENCOUNTER — RX RENEWAL (OUTPATIENT)
Age: 83
End: 2018-08-20

## 2018-08-20 ENCOUNTER — OTHER (OUTPATIENT)
Age: 83
End: 2018-08-20

## 2018-08-20 DIAGNOSIS — F03.90 UNSPECIFIED DEMENTIA W/OUT BEHAVIORAL DISTURBANCE: ICD-10-CM

## 2018-08-22 ENCOUNTER — APPOINTMENT (OUTPATIENT)
Dept: FAMILY MEDICINE | Facility: CLINIC | Age: 83
End: 2018-08-22

## 2018-08-27 ENCOUNTER — RX RENEWAL (OUTPATIENT)
Age: 83
End: 2018-08-27

## 2018-09-04 ENCOUNTER — RX RENEWAL (OUTPATIENT)
Age: 83
End: 2018-09-04

## 2018-09-27 ENCOUNTER — APPOINTMENT (OUTPATIENT)
Dept: CARDIOLOGY | Facility: CLINIC | Age: 83
End: 2018-09-27
Payer: COMMERCIAL

## 2018-09-27 PROCEDURE — 93294 REM INTERROG EVL PM/LDLS PM: CPT

## 2018-12-30 ENCOUNTER — RX RENEWAL (OUTPATIENT)
Age: 83
End: 2018-12-30

## 2019-06-09 PROBLEM — I51.9 HEART DISEASE: Status: ACTIVE | Noted: 2017-07-20
